# Patient Record
Sex: MALE | Race: WHITE | Employment: STUDENT | URBAN - METROPOLITAN AREA
[De-identification: names, ages, dates, MRNs, and addresses within clinical notes are randomized per-mention and may not be internally consistent; named-entity substitution may affect disease eponyms.]

---
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
## 2023-05-23 ENCOUNTER — HOSPITAL ENCOUNTER (OUTPATIENT)
Age: 18
Discharge: HOME | End: 2023-05-23
Payer: COMMERCIAL

## 2023-05-23 VITALS
BODY MASS INDEX: 34.24 KG/M2 | HEIGHT: 77 IN | WEIGHT: 290 LBS | TEMPERATURE: 99 F | SYSTOLIC BLOOD PRESSURE: 144 MMHG | HEART RATE: 90 BPM | OXYGEN SATURATION: 96 % | RESPIRATION RATE: 20 BRPM | DIASTOLIC BLOOD PRESSURE: 70 MMHG

## 2023-05-23 DIAGNOSIS — L60.0 INGROWN TOENAIL OF BOTH FEET: Primary | ICD-10-CM

## 2023-05-23 PROCEDURE — 99202 OFFICE O/P NEW SF 15 MIN: CPT | Performed by: PHYSICIAN ASSISTANT

## 2023-06-06 ENCOUNTER — HOSPITAL ENCOUNTER (OUTPATIENT)
Age: 18
Discharge: HOME | End: 2023-06-06
Payer: COMMERCIAL

## 2023-06-06 VITALS
OXYGEN SATURATION: 97 % | RESPIRATION RATE: 16 BRPM | BODY MASS INDEX: 34.24 KG/M2 | TEMPERATURE: 97 F | SYSTOLIC BLOOD PRESSURE: 138 MMHG | HEART RATE: 93 BPM | HEIGHT: 77 IN | DIASTOLIC BLOOD PRESSURE: 72 MMHG | WEIGHT: 290 LBS

## 2023-06-06 DIAGNOSIS — L29.0 PERIANAL IRRITATION: Primary | ICD-10-CM

## 2023-06-06 PROCEDURE — 99213 OFFICE O/P EST LOW 20 MIN: CPT | Performed by: PHYSICIAN ASSISTANT

## 2023-06-06 NOTE — DISCHARGE INSTRUCTIONS
Make sure to keep the area clean and dry you can do warm Epsom soaks if you have persistent symptoms or you start to have a firm tender area you should be reevaluated as this could be developing into a perineal abscess

## 2023-06-29 ENCOUNTER — HOSPITAL ENCOUNTER (OUTPATIENT)
Age: 18
Discharge: HOME | End: 2023-06-29
Payer: COMMERCIAL

## 2023-06-29 VITALS
DIASTOLIC BLOOD PRESSURE: 75 MMHG | HEART RATE: 94 BPM | SYSTOLIC BLOOD PRESSURE: 135 MMHG | BODY MASS INDEX: 34.83 KG/M2 | TEMPERATURE: 97 F | OXYGEN SATURATION: 95 % | WEIGHT: 295 LBS | HEIGHT: 77 IN | RESPIRATION RATE: 22 BRPM

## 2023-06-29 DIAGNOSIS — L05.01 PILONIDAL CYST WITH ABSCESS: Primary | ICD-10-CM

## 2023-06-29 PROCEDURE — 99214 OFFICE O/P EST MOD 30 MIN: CPT

## 2023-06-29 PROCEDURE — 99213 OFFICE O/P EST LOW 20 MIN: CPT

## 2023-06-29 RX ORDER — SULFAMETHOXAZOLE AND TRIMETHOPRIM 800; 160 MG/1; MG/1
1 TABLET ORAL 2 TIMES DAILY
Qty: 14 TABLET | Refills: 0 | Status: SHIPPED | OUTPATIENT
Start: 2023-06-29 | End: 2023-07-06

## 2023-06-29 NOTE — DISCHARGE INSTRUCTIONS
Perform warm soaks, 20 minutes, with Epsom salt twice daily for the next few days. Take antibiotic as written.   Recommend following up with general surgery

## 2023-06-29 NOTE — ED INITIAL ASSESSMENT (HPI)
Patient presents to IC with c/o right gluteal cyst x 2 days. h/o cyst same location in April and Feb2023.

## 2023-07-05 ENCOUNTER — OFFICE VISIT (OUTPATIENT)
Facility: LOCATION | Age: 18
End: 2023-07-05
Payer: COMMERCIAL

## 2023-07-05 VITALS — HEART RATE: 93 BPM | TEMPERATURE: 99 F

## 2023-07-05 DIAGNOSIS — L05.91 CHRONIC RECURRENT PILONIDAL CYST: Primary | ICD-10-CM

## 2023-07-05 PROCEDURE — 99203 OFFICE O/P NEW LOW 30 MIN: CPT | Performed by: SURGERY

## 2023-07-13 ENCOUNTER — TELEPHONE (OUTPATIENT)
Facility: LOCATION | Age: 18
End: 2023-07-13

## 2023-07-13 RX ORDER — SULFAMETHOXAZOLE AND TRIMETHOPRIM 800; 160 MG/1; MG/1
1 TABLET ORAL 2 TIMES DAILY
Qty: 14 TABLET | Refills: 0 | Status: SHIPPED | OUTPATIENT
Start: 2023-07-13 | End: 2023-07-20

## 2023-07-13 NOTE — TELEPHONE ENCOUNTER
Pt has PILONIDAL CYSTECTOMY WITH FLAP CLOSURE  on 8/31. He is going on vacation, and is wondering if we can fill the antibiotic for him before his vacation so if he has a flare-up while he's away from home, he can take the antibiotic to deal with the pain.     Please advise  Best callback number is [phone]

## 2023-08-21 ENCOUNTER — TELEPHONE (OUTPATIENT)
Facility: LOCATION | Age: 18
End: 2023-08-21

## 2023-08-21 NOTE — TELEPHONE ENCOUNTER
Transaction ID: [id number] ID: [id number] Date: 2023-08-21  Apryl Barron Patient  Member ID  [id number]    Date of Birth  2005-07-31    Gender  Male    Relationship to [address] Name  [address], [address]    Payer  Jesus Campbell [address]  Reference Number  [id number]    Status  NO ACTION REQUIRED    Message  Requested Service does not require preauthorization. We would strongly encourage you to check benefits for this service.     Member Information  Patient Name  Apryl Barron    Patient Date of Birth  2005-07-31    Patient Gender  Male    Member ID  [id number]    Relationship to [address] Name  [facility]    Requesting Provider     Name  [address] [address], [address] [facility]  [id number]    Tax Id  [id number]    Specialty  [id number]  Provider Role  Provider    Address  [address] [facility], [address] [place] [address], [address]    Phone  [phone]  Fax  [phone]    Contact Name  [address] [place] [address]    Service Information  Service Type  2 - Surgical    Place of Service  [address] [place] [address]    Service From - To Date  2023-08-31 - 2023-12-28    Level of Service  Elective    Diagnosis Code 1   - Pilonidal cyst without abscess    Procedure Code 1 (CPT/HCPCS)  37962 - TIS TRNFR TRUNK 10.1-30SQCM    Quantity  1 Units    Status  NO ACTION REQUIRED    Rendering Provider/Facility     Provider 1  Name  [address] [address], [address]  [id number]    Specialty  [id number]  Provider Role  Attending    Address  [address], [address] [place] [address], [address]    Provider 2  Name  [facility]  [id number]    Provider Role  Facility    Address  [address] [facility], [address] [facility], [address]

## 2023-08-31 ENCOUNTER — ANESTHESIA (OUTPATIENT)
Dept: SURGERY | Facility: HOSPITAL | Age: 18
End: 2023-08-31
Payer: COMMERCIAL

## 2023-08-31 ENCOUNTER — HOSPITAL ENCOUNTER (OUTPATIENT)
Facility: HOSPITAL | Age: 18
Setting detail: HOSPITAL OUTPATIENT SURGERY
Discharge: HOME | End: 2023-08-31
Attending: SURGERY | Admitting: SURGERY
Payer: COMMERCIAL

## 2023-08-31 ENCOUNTER — ANESTHESIA EVENT (OUTPATIENT)
Dept: SURGERY | Facility: HOSPITAL | Age: 18
End: 2023-08-31
Payer: COMMERCIAL

## 2023-08-31 VITALS
HEART RATE: 83 BPM | RESPIRATION RATE: 18 BRPM | WEIGHT: 315 LBS | DIASTOLIC BLOOD PRESSURE: 70 MMHG | BODY MASS INDEX: 37.19 KG/M2 | OXYGEN SATURATION: 97 % | SYSTOLIC BLOOD PRESSURE: 97 MMHG | TEMPERATURE: 97 F | HEIGHT: 77 IN

## 2023-08-31 DIAGNOSIS — L05.91 PILONIDAL CYST: ICD-10-CM

## 2023-08-31 PROCEDURE — 88304 TISSUE EXAM BY PATHOLOGIST: CPT | Performed by: SURGERY

## 2023-08-31 PROCEDURE — 0HX8XZZ TRANSFER BUTTOCK SKIN, EXTERNAL APPROACH: ICD-10-PCS | Performed by: SURGERY

## 2023-08-31 PROCEDURE — 0JB90ZZ EXCISION OF BUTTOCK SUBCUTANEOUS TISSUE AND FASCIA, OPEN APPROACH: ICD-10-PCS | Performed by: SURGERY

## 2023-08-31 RX ORDER — NALOXONE HYDROCHLORIDE 0.4 MG/ML
0.08 INJECTION, SOLUTION INTRAMUSCULAR; INTRAVENOUS; SUBCUTANEOUS AS NEEDED
Status: DISCONTINUED | OUTPATIENT
Start: 2023-08-31 | End: 2023-08-31

## 2023-08-31 RX ORDER — CEFADROXIL 500 MG/1
500 CAPSULE ORAL 2 TIMES DAILY
Qty: 28 CAPSULE | Refills: 0 | Status: SHIPPED | OUTPATIENT
Start: 2023-08-31 | End: 2023-09-14

## 2023-08-31 RX ORDER — MIDAZOLAM HYDROCHLORIDE 1 MG/ML
1 INJECTION INTRAMUSCULAR; INTRAVENOUS EVERY 5 MIN PRN
Status: DISCONTINUED | OUTPATIENT
Start: 2023-08-31 | End: 2023-08-31

## 2023-08-31 RX ORDER — SCOLOPAMINE TRANSDERMAL SYSTEM 1 MG/1
1 PATCH, EXTENDED RELEASE TRANSDERMAL ONCE
Status: DISCONTINUED | OUTPATIENT
Start: 2023-08-31 | End: 2023-08-31 | Stop reason: HOSPADM

## 2023-08-31 RX ORDER — HYDROCODONE BITARTRATE AND ACETAMINOPHEN 5; 325 MG/1; MG/1
1 TABLET ORAL ONCE AS NEEDED
Status: DISCONTINUED | OUTPATIENT
Start: 2023-08-31 | End: 2023-08-31

## 2023-08-31 RX ORDER — KETOROLAC TROMETHAMINE 30 MG/ML
INJECTION, SOLUTION INTRAMUSCULAR; INTRAVENOUS AS NEEDED
Status: DISCONTINUED | OUTPATIENT
Start: 2023-08-31 | End: 2023-08-31 | Stop reason: SURG

## 2023-08-31 RX ORDER — HYDROCODONE BITARTRATE AND ACETAMINOPHEN 5; 325 MG/1; MG/1
2 TABLET ORAL ONCE AS NEEDED
Status: DISCONTINUED | OUTPATIENT
Start: 2023-08-31 | End: 2023-08-31

## 2023-08-31 RX ORDER — HEPARIN SODIUM 5000 [USP'U]/ML
5000 INJECTION, SOLUTION INTRAVENOUS; SUBCUTANEOUS ONCE
Status: COMPLETED | OUTPATIENT
Start: 2023-08-31 | End: 2023-08-31

## 2023-08-31 RX ORDER — HYDROMORPHONE HYDROCHLORIDE 1 MG/ML
0.2 INJECTION, SOLUTION INTRAMUSCULAR; INTRAVENOUS; SUBCUTANEOUS EVERY 5 MIN PRN
Status: DISCONTINUED | OUTPATIENT
Start: 2023-08-31 | End: 2023-08-31

## 2023-08-31 RX ORDER — SODIUM CHLORIDE, SODIUM LACTATE, POTASSIUM CHLORIDE, CALCIUM CHLORIDE 600; 310; 30; 20 MG/100ML; MG/100ML; MG/100ML; MG/100ML
INJECTION, SOLUTION INTRAVENOUS CONTINUOUS
Status: DISCONTINUED | OUTPATIENT
Start: 2023-08-31 | End: 2023-08-31

## 2023-08-31 RX ORDER — ROCURONIUM BROMIDE 10 MG/ML
INJECTION, SOLUTION INTRAVENOUS AS NEEDED
Status: DISCONTINUED | OUTPATIENT
Start: 2023-08-31 | End: 2023-08-31 | Stop reason: SURG

## 2023-08-31 RX ORDER — ONDANSETRON 2 MG/ML
INJECTION INTRAMUSCULAR; INTRAVENOUS AS NEEDED
Status: DISCONTINUED | OUTPATIENT
Start: 2023-08-31 | End: 2023-08-31 | Stop reason: SURG

## 2023-08-31 RX ORDER — ACETAMINOPHEN 500 MG
1000 TABLET ORAL ONCE AS NEEDED
Status: DISCONTINUED | OUTPATIENT
Start: 2023-08-31 | End: 2023-08-31

## 2023-08-31 RX ORDER — DEXAMETHASONE SODIUM PHOSPHATE 4 MG/ML
VIAL (ML) INJECTION AS NEEDED
Status: DISCONTINUED | OUTPATIENT
Start: 2023-08-31 | End: 2023-08-31 | Stop reason: SURG

## 2023-08-31 RX ORDER — HYDROMORPHONE HYDROCHLORIDE 1 MG/ML
0.4 INJECTION, SOLUTION INTRAMUSCULAR; INTRAVENOUS; SUBCUTANEOUS EVERY 5 MIN PRN
Status: DISCONTINUED | OUTPATIENT
Start: 2023-08-31 | End: 2023-08-31

## 2023-08-31 RX ORDER — ONDANSETRON 2 MG/ML
4 INJECTION INTRAMUSCULAR; INTRAVENOUS EVERY 6 HOURS PRN
Status: DISCONTINUED | OUTPATIENT
Start: 2023-08-31 | End: 2023-08-31

## 2023-08-31 RX ORDER — CEFAZOLIN SODIUM/WATER 2 G/20 ML
SYRINGE (ML) INTRAVENOUS
Status: DISCONTINUED
Start: 2023-08-31 | End: 2023-08-31 | Stop reason: WASHOUT

## 2023-08-31 RX ORDER — HYDROMORPHONE HYDROCHLORIDE 1 MG/ML
0.6 INJECTION, SOLUTION INTRAMUSCULAR; INTRAVENOUS; SUBCUTANEOUS EVERY 5 MIN PRN
Status: DISCONTINUED | OUTPATIENT
Start: 2023-08-31 | End: 2023-08-31

## 2023-08-31 RX ORDER — HYDROCODONE BITARTRATE AND ACETAMINOPHEN 5; 325 MG/1; MG/1
1 TABLET ORAL EVERY 6 HOURS PRN
Qty: 20 TABLET | Refills: 0 | Status: SHIPPED | OUTPATIENT
Start: 2023-08-31

## 2023-08-31 RX ORDER — BUPIVACAINE HYDROCHLORIDE AND EPINEPHRINE 5; 5 MG/ML; UG/ML
INJECTION, SOLUTION EPIDURAL; INTRACAUDAL; PERINEURAL AS NEEDED
Status: DISCONTINUED | OUTPATIENT
Start: 2023-08-31 | End: 2023-08-31

## 2023-08-31 RX ORDER — CEFAZOLIN SODIUM IN 0.9 % NACL 3 G/100 ML
3 INTRAVENOUS SOLUTION, PIGGYBACK (ML) INTRAVENOUS ONCE
Status: COMPLETED | OUTPATIENT
Start: 2023-08-31 | End: 2023-08-31

## 2023-08-31 RX ORDER — HYDROMORPHONE HYDROCHLORIDE 1 MG/ML
INJECTION, SOLUTION INTRAMUSCULAR; INTRAVENOUS; SUBCUTANEOUS
Status: COMPLETED
Start: 2023-08-31 | End: 2023-08-31

## 2023-08-31 RX ORDER — ACETAMINOPHEN 500 MG
1000 TABLET ORAL ONCE
Status: DISCONTINUED | OUTPATIENT
Start: 2023-08-31 | End: 2023-08-31 | Stop reason: HOSPADM

## 2023-08-31 RX ORDER — HEPARIN SODIUM 5000 [USP'U]/ML
INJECTION, SOLUTION INTRAVENOUS; SUBCUTANEOUS
Status: DISCONTINUED
Start: 2023-08-31 | End: 2023-08-31

## 2023-08-31 RX ORDER — ONDANSETRON 2 MG/ML
INJECTION INTRAMUSCULAR; INTRAVENOUS
Status: COMPLETED
Start: 2023-08-31 | End: 2023-08-31

## 2023-08-31 RX ADMIN — KETOROLAC TROMETHAMINE 30 MG: 30 INJECTION, SOLUTION INTRAMUSCULAR; INTRAVENOUS at 11:09:00

## 2023-08-31 RX ADMIN — ROCURONIUM BROMIDE 50 MG: 10 INJECTION, SOLUTION INTRAVENOUS at 10:18:00

## 2023-08-31 RX ADMIN — DEXAMETHASONE SODIUM PHOSPHATE 8 MG: 4 MG/ML VIAL (ML) INJECTION at 10:49:00

## 2023-08-31 RX ADMIN — ONDANSETRON 4 MG: 2 INJECTION INTRAMUSCULAR; INTRAVENOUS at 10:52:00

## 2023-08-31 RX ADMIN — CEFAZOLIN SODIUM IN 0.9 % NACL 3 G: 3 G/100 ML INTRAVENOUS SOLUTION, PIGGYBACK (ML) INTRAVENOUS at 10:35:00

## 2023-08-31 RX ADMIN — CEFAZOLIN SODIUM IN 0.9 % NACL: 3 G/100 ML INTRAVENOUS SOLUTION, PIGGYBACK (ML) INTRAVENOUS at 11:28:00

## 2023-08-31 RX ADMIN — ROCURONIUM BROMIDE 20 MG: 10 INJECTION, SOLUTION INTRAVENOUS at 10:50:00

## 2023-08-31 NOTE — OPERATIVE REPORT
[facility]  Op Note    Mello Barron Location: OR   LUZ [id number] MRN [id number]   Admission Date 8/31/2023 Operation Date 8/31/2023   Attending Physician Donaldo Mcghee MD Operating Physician Chris Uribe MD     DATE OF OPERATION:  8/31/2023  PREOPERATIVE DIAGNOSIS: Recurrent pilonidal cyst.   POSTOPERATIVE DIAGNOSIS: Recurrent pilonidal cyst.   PROCEDURE PERFORMED: Pilonidal cystectomy and closure of wound with advancement flap. SURGEON:  Chris Uribe M.D. ANESTHESIA: General.   SPECIMEN: Pilonidal cyst to Pathology. BLOOD LOSS: 10 mL. COMPLICATIONS: None. INDICATIONS: The patient is a 78-year-old gentleman who has had 3 episodes of infections with his pilonidal cyst.  Physical exam revealed a pilonidal cyst.  The risks, benefits, and alternatives to pilonidal cystectomy with closure of the wound with an advancement flap were discussed in detail with the patient. The risks included but were not limited to bleeding, wound infection, and recurrence of pilonidal disease. The patient was agreeable to proceed with the operation. PROCEDURE: After informed consent was obtained, patient taken to the operating room. General anesthesia was induced. The patient was then placed in the prone jackknife position. The buttocks were taped apart, and the sacral area was prepped and draped in the usual sterile fashion. The pilonidal tract was probed. The edges were drawn on the patient's skin. The lateral portion of the cyst was incised using a 15 blade scalpel. Cautery was used to obtain hemostasis and then to continue dissection down to the subcutaneous tissues. The edge of the pilonidal cyst was identified. The cyst was excised from the surrounding tissues using cautery, rolling the cyst over the opposite lateral edge.   Once the cyst was completely excised, the final portion of the ellipse was made through the skin on the opposite lateral edge of the cyst.  Cautery was used to obtain hemostasis. A tissue flap was raised along the first lateral edge, extending for about 1 cm. The tapes on the buttock were then released. This allowed for adequate tissue reapproximation. The wound was irrigated with normal saline. A 19 [employer] drain was passed on the field. It was passed out lateral to the wound and secured with a 2-0 nylon. The incision was then closed in 2 layers, using an 0 Vicryl on the deep layer and a 4-0 Monocryl on the skin. The incision was infiltrated with local anesthetic. Steri-Strips with Mastisol were placed across the incision. Sterile dressings were applied externally. The patient was returned to supine position. The patient was extubated in the OR. He tolerated the procedure well and was transferred to the PACU in good condition.      Nette Porras MD

## 2023-08-31 NOTE — H&P
History & Physical Examination    Patient Name: Jose Carlos Riley  MRN: [id number]  [facility]: [id number]  YOB: 2005    Diagnosis: Recurrent pilonidal cyst    Present Illness: The patient is a 25-year-old gentleman seen at the request of the urgent care center regarding a pilonidal cyst. The patient states he first noticed it in February. 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 It then came back in April and was drained again. Recently, it recurred a third time. He went to the urgent care center where he was started on antibiotics. It is now smaller. He presents for definitive treatment. [] sulfamethoxazole-trimethoprim DS (BACTRIM DS) 800-160 MG Oral Tab per tablet, Take 1 tablet by mouth 2 (two) times daily for 7 days. , Disp: 14 tablet, Rfl: 0      [MAR Hold] acetaminophen (Tylenol Extra Strength) tab 1,000 mg, 1,000 mg, Oral, Once  [MAR Hold] scopolamine (Transderm-Scop) 1 MG/3DAYS patch 1 patch, 1 patch, Transdermal, Once  lactated ringers infusion, , Intravenous, Continuous  ceFAZolin (Ancef) 3 g in sodium chloride 0.9% 100mL IVPB premix, 3 g, Intravenous, Once  heparin (Porcine) 5000 UNIT/ML injection, , ,         Allergies: No Known Allergies    Past Medical History:   Diagnosis Date    Visual impairment     Glasses     Past Surgical History:   Procedure Laterality Date    CYST REMOVAL       History reviewed. No pertinent family history. Social History    Tobacco Use      Smoking status: Never      Smokeless tobacco: Never    Alcohol use: Never      SYSTEM Check if Review is Normal Check if Physical Exam is Normal If not normal, please explain:   HEENT [x ] [x ]    NECK & BACK [x ] [x ]    HEART [x ] [x ]    LUNGS [x ] [x ]    ABDOMEN [x ] [x ]    UROGENITAL [ ] [ ] pilonidal   EXTREMITIES [x ] [x ]    OTHER        [ x ] I have discussed the risks and benefits and alternatives with the patient/family. They understand and agree to proceed with plan of care.   [ x ] I have reviewed the History and Physical done within the last 30 days. Any changes noted above.     José Miguel Griffiths MD  8/31/2023  10:05 AM

## 2023-09-06 ENCOUNTER — TELEPHONE (OUTPATIENT)
Facility: LOCATION | Age: 18
End: 2023-09-06

## 2023-09-06 NOTE — TELEPHONE ENCOUNTER
S/w pt, he states drain is working, he has been trying multiple positions today and no problems, continues to drain. I reviewed with him if the bulb looses suction to always make sure decompresses it back to suction. Reviewed with him if the drain stops draining all together to call.   For last couple days he said it was 40-45ml/24hr

## 2023-09-07 ENCOUNTER — TELEPHONE (OUTPATIENT)
Facility: LOCATION | Age: 18
End: 2023-09-07

## 2023-09-07 NOTE — TELEPHONE ENCOUNTER
Yesterday around 5 the stitches started leaking a lot. Says not blood, but yellow pus, a google told him that's not good. Is curious what to do next. Says leaking < nosebleed, but more than it's been previously. Says experiencing 3-4/10 pain for the first time in a couple of days. Denies fever.      Please advise  Best callback number is [phone]

## 2023-09-07 NOTE — TELEPHONE ENCOUNTER
S/w pt. He states the wound has been draining yellow pus like drainage. Denies redness, warmth, fever, chills. Pt sent picture through [employer]. Reviewed s/s with Irwinton Rape PA. No new orders received - reviewed protein exudate. Called pt back and reviewed the above. Reviewed with pt to call office if he starts having any fever, chills, redness, warmth.   Pt verbalized understanding  Post op appt moved to 9/11

## 2023-09-11 ENCOUNTER — OFFICE VISIT (OUTPATIENT)
Facility: LOCATION | Age: 18
End: 2023-09-11

## 2023-09-11 VITALS — HEART RATE: 103 BPM | TEMPERATURE: 98 F

## 2023-09-11 DIAGNOSIS — Z98.890 POSTOPERATIVE STATE: Primary | ICD-10-CM

## 2023-09-11 PROCEDURE — 99024 POSTOP FOLLOW-UP VISIT: CPT

## 2023-09-12 ENCOUNTER — TELEPHONE (OUTPATIENT)
Facility: LOCATION | Age: 18
End: 2023-09-12

## 2023-10-16 ENCOUNTER — OFFICE VISIT (OUTPATIENT)
Facility: LOCATION | Age: 18
End: 2023-10-16

## 2023-10-16 VITALS — TEMPERATURE: 98 F | HEART RATE: 99 BPM

## 2023-10-16 DIAGNOSIS — Z98.890 POSTOPERATIVE STATE: Primary | ICD-10-CM

## 2023-10-16 PROCEDURE — 99024 POSTOP FOLLOW-UP VISIT: CPT

## 2023-11-03 ENCOUNTER — OFFICE VISIT (OUTPATIENT)
Facility: LOCATION | Age: 18
End: 2023-11-03

## 2023-11-03 VITALS — TEMPERATURE: 98 F | HEART RATE: 101 BPM

## 2023-11-03 DIAGNOSIS — Z98.890 POST-OPERATIVE STATE: Primary | ICD-10-CM

## 2023-11-03 PROCEDURE — 99024 POSTOP FOLLOW-UP VISIT: CPT | Performed by: PHYSICIAN ASSISTANT

## (undated) DEVICE — SLEEVE KENDALL SCD EXPRESS MED

## (undated) DEVICE — SOL NACL IRRIG 0.9% 1000ML BTL

## (undated) DEVICE — PAD SACRAL PREMIUM 12X12X1

## (undated) DEVICE — SKN PREP SPNG STKS PVP PNT STR: Brand: MEDLINE INDUSTRIES, INC.

## (undated) DEVICE — CURAD PAPER TAPE 2IN

## (undated) DEVICE — GAUZE,SPONGE,FLUFF,6"X6.75",STRL,5/TRAY: Brand: MEDLINE

## (undated) DEVICE — PAD,ABDOMINAL,8"X7.5",ST,LF,20/BX: Brand: MEDLINE INDUSTRIES, INC.

## (undated) DEVICE — 3M™ TEGADERM™ TRANSPARENT FILM DRESSING FRAME STYLE, 1624W, US-VER, 100/CARTON 4 CARTONS/CASE: Brand: 3M™ TEGADERM™

## (undated) DEVICE — SUT ETHILON 2-0 FS 664H

## (undated) DEVICE — DRAIN CHANNEL 19FR BLAKE

## (undated) DEVICE — STERILE POLYISOPRENE POWDER-FREE SURGICAL GLOVES: Brand: PROTEXIS

## (undated) DEVICE — MINI LAP PACK-LF: Brand: MEDLINE INDUSTRIES, INC.

## (undated) DEVICE — EVACUATOR RELIAVAC 100CC